# Patient Record
Sex: FEMALE | Race: AMERICAN INDIAN OR ALASKA NATIVE
[De-identification: names, ages, dates, MRNs, and addresses within clinical notes are randomized per-mention and may not be internally consistent; named-entity substitution may affect disease eponyms.]

---

## 2017-09-06 ENCOUNTER — HOSPITAL ENCOUNTER (OUTPATIENT)
Dept: HOSPITAL 5 - SPVWC | Age: 42
Discharge: HOME | End: 2017-09-06
Attending: NURSE PRACTITIONER
Payer: MEDICAID

## 2017-09-06 DIAGNOSIS — N60.41: Primary | ICD-10-CM

## 2017-09-06 DIAGNOSIS — I10: ICD-10-CM

## 2017-09-06 DIAGNOSIS — E03.9: ICD-10-CM

## 2017-09-06 DIAGNOSIS — N64.89: ICD-10-CM

## 2017-09-06 PROCEDURE — G0204 DX MAMMO INCL CAD BI: HCPCS

## 2017-09-06 PROCEDURE — 77066 DX MAMMO INCL CAD BI: CPT

## 2017-09-06 PROCEDURE — 76641 ULTRASOUND BREAST COMPLETE: CPT

## 2017-09-06 NOTE — ULTRASOUND REPORT
BILATERAL DIGITAL DIAGNOSTIC MAMMOGRAM with CAD and RIGHT BREAST 

ULTRASOUND: 09/06/17 



CLINICAL: History of a right yellowish nipple discharge.  She no longer 

has a nipple discharge but her breast-feeding child's does not like the 

milk from her right breast.



COMPARISON:06/05/13



FINDINGS: The breasts are heterogeneously dense, which may obscure 

small masses.  Right retroareolar duct ectasia is less prominent than 

on the prior exam.No mass, architectural distortion or suspicious 

calcifications .  



Ultrasound of the right breast (including all four quadrants and the 

retroareolar area) was performed.  Moderate retroareolar ductal ectasia 

which is decreased compared to the previous exam.  A retroareolar mass 

at 4 o'clock has an irregular margin and measures 1.1 x 0.6 x 0.4 cm.  

No other mass.  Ultrasound of the right axilla demonstrated a lymph 

node with central fat and benign morphology measuring 8 x 4 x 6 mm.





IMPRESSION: Moderate retroareolar right duct ectasia and a 1.1 cm right 

retroareolar intraductal mass.  Negative left breast.



BI-RADS CATEGORY:  4--Suspicious



RECOMMENDATION: Ultrasound guided vacuum assisted needle core biopsy of 

the right breast.



I discussed the findings and the recommendation for an 

ultrasound-guided right needle core breast biopsy with the patient at 

the time of the examination.



ACR BI-RADS MAMMOGRAPHIC CODES:

0 = Needs additional imaging evaluation; 1 = Negative; 2 = Benign; 3 = 

Probably benign; 4 = Suspicious; 5 = Malignant; 6 = Known biopsy-proven 

malignancy



COMMENT:

      1.   Dense breast tissue, i.e., adenosis, fibrocystic 

            changes, etc., may obscure an underlying neoplasm.

      2.   Approximately 10% of cancers are not detected with

            mammography.

      3.   A negative mammography report should not delay biopsy 

            if a clinically suspicious mass is present.





COMMENT:

Patient follow-up letters are generated by our [employer] application.
week(s)/1

## 2017-09-06 NOTE — MAMMOGRAPHY REPORT
BILATERAL DIGITAL DIAGNOSTIC MAMMOGRAM with CAD and RIGHT BREAST 

ULTRASOUND: 09/06/17 



CLINICAL: History of a right yellowish nipple discharge.  She no longer 

has a nipple discharge but her breast-feeding child's does not like the 

milk from her right breast.



COMPARISON:06/05/13



FINDINGS: The breasts are heterogeneously dense, which may obscure 

small masses.  Right retroareolar duct ectasia is less prominent than 

on the prior exam.No mass, architectural distortion or suspicious 

calcifications .  



Ultrasound of the right breast (including all four quadrants and the 

retroareolar area) was performed.  Moderate retroareolar ductal ectasia 

which is decreased compared to the previous exam.  A retroareolar mass 

at 4 o'clock has an irregular margin and measures 1.1 x 0.6 x 0.4 cm.  

No other mass.  Ultrasound of the right axilla demonstrated a lymph 

node with central fat and benign morphology measuring 8 x 4 x 6 mm.





IMPRESSION: Moderate retroareolar right duct ectasia and a 1.1 cm right 

retroareolar intraductal mass.  Negative left breast.



BI-RADS CATEGORY:  4--Suspicious



RECOMMENDATION: Ultrasound guided vacuum assisted needle core biopsy of 

the right breast.



I discussed the findings and the recommendation for an 

ultrasound-guided right needle core breast biopsy with the patient at 

the time of the examination.



ACR BI-RADS MAMMOGRAPHIC CODES:

0 = Needs additional imaging evaluation; 1 = Negative; 2 = Benign; 3 = 

Probably benign; 4 = Suspicious; 5 = Malignant; 6 = Known biopsy-proven 

malignancy



COMMENT:

      1.   Dense breast tissue, i.e., adenosis, fibrocystic 

            changes, etc., may obscure an underlying neoplasm.

      2.   Approximately 10% of cancers are not detected with

            mammography.

      3.   A negative mammography report should not delay biopsy 

            if a clinically suspicious mass is present.





COMMENT:

Patient follow-up letters are generated by our [employer] application.

## 2017-09-20 ENCOUNTER — HOSPITAL ENCOUNTER (OUTPATIENT)
Dept: HOSPITAL 5 - SPVWC | Age: 42
Discharge: HOME | End: 2017-09-20
Attending: NURSE PRACTITIONER
Payer: MEDICAID

## 2017-09-20 DIAGNOSIS — I10: ICD-10-CM

## 2017-09-20 DIAGNOSIS — N63: Primary | ICD-10-CM

## 2017-09-20 PROCEDURE — 19083 BX BREAST 1ST LESION US IMAG: CPT

## 2017-09-20 PROCEDURE — 88305 TISSUE EXAM BY PATHOLOGIST: CPT

## 2017-09-20 PROCEDURE — A4648 IMPLANTABLE TISSUE MARKER: HCPCS

## 2017-09-20 NOTE — ULTRASOUND REPORT
VACUUM ASSISTED ULTRASOUND GUIDED NEEDLE CORE BIOPSY WITH CLIP 

PLACEMENT RIGHT BREAST: 09/20/17 08:46:00





CLINICAL: A 1.1 cm right retroareolar intraductal mass.



COMPARISON :09/06/17



FINDINGS: The procedure was explained to the patient and informed 

consent was obtained.  



Ultrasound demonstrated the previously described mass.



The skin was prepped with Betadine and anesthetized with 1% lidocaine.  

Vacuum-assisted needle core biopsy was performed through a small 

dermatotomy using ultrasound guidance, 2% lidocaine with epinephrine 

for deep anesthesia and a 13-gauge Mammotome Elite biopsy probe.  

Multiple cores were obtained and placed in formalin.  A hydro-bran clip 

was deployed within the lesion. 



Hemostasis was achieved with minimal pressure and a sterile dressing 

was applied. The patient tolerated the procedure well and there were no 

apparent complications. 

A post procedure mammogram was not performed.



The patient left the department in good condition and was given 

instructions for wound care and follow up.





IMPRESSION: Uncomplicated vacuum-assisted ultrasound core biopsy and 

clip placement right breast.

## 2019-07-12 LAB
BUN SERPL-MCNC: 15 MG/DL (ref 7–17)
BUN/CREAT SERPL: 21 %
CALCIUM SERPL-MCNC: 9.3 MG/DL (ref 8.4–10.2)
HCT VFR BLD CALC: 39.3 % (ref 30.3–42.9)
HEMOLYSIS INDEX: 18
HGB BLD-MCNC: 13.6 GM/DL (ref 10.1–14.3)
MCHC RBC AUTO-ENTMCNC: 35 % (ref 30–34)
MCV RBC AUTO: 91 FL (ref 79–97)
PLATELET # BLD: 227 K/MM3 (ref 140–440)
RBC # BLD AUTO: 4.33 M/MM3 (ref 3.65–5.03)

## 2019-07-12 NOTE — ANESTHESIA CONSULTATION
Anesthesia Consult and Med Hx


Date of service: 07/16/19





- Airway


Anesthetic Teeth Evaluation: Good, Caps


ROM Head & Neck: Adequate


Mental/Hyoid Distance: Adequate


Mallampati Class: Class II


Intubation Access Assessment: Good





- Pre-Operative Health Status


ASA Pre-Surgery Classification: ASA2


Proposed Anesthetic Plan: General





- Pulmonary


Hx Smoking: Yes (Former)


Hx Asthma: No


COPD: No


Hx Pneumonia: No





- Cardiovascular System


Hx Hypertension: Yes (x 4 yrs)





- Central Nervous System


Hx Seizures: No


Hx Psychiatric Problems: Yes (Anxiety)





- Endocrine


Hx Renal Disease: No


Hx End Stage Renal Disease: No


Hx Hypothyroidism: Yes


Hx Hyperthyroidism: No





- Hematic


Hx Anemia: No


Hx Sickle Cell Disease: No





- Other Systems


Hx Alcohol Use: No


Hx Substance Use: Yes (Marijuana daily)


Hx Cancer: No

## 2019-07-16 ENCOUNTER — HOSPITAL ENCOUNTER (OUTPATIENT)
Dept: HOSPITAL 5 - OR | Age: 44
Discharge: HOME | End: 2019-07-16
Attending: OBSTETRICS & GYNECOLOGY
Payer: MEDICAID

## 2019-07-16 VITALS — SYSTOLIC BLOOD PRESSURE: 114 MMHG | DIASTOLIC BLOOD PRESSURE: 71 MMHG

## 2019-07-16 DIAGNOSIS — E03.9: ICD-10-CM

## 2019-07-16 DIAGNOSIS — Z98.890: ICD-10-CM

## 2019-07-16 DIAGNOSIS — Z30.432: ICD-10-CM

## 2019-07-16 DIAGNOSIS — Z91.011: ICD-10-CM

## 2019-07-16 DIAGNOSIS — I10: ICD-10-CM

## 2019-07-16 DIAGNOSIS — Z79.899: ICD-10-CM

## 2019-07-16 DIAGNOSIS — Z88.5: ICD-10-CM

## 2019-07-16 DIAGNOSIS — F41.9: ICD-10-CM

## 2019-07-16 DIAGNOSIS — Z30.2: Primary | ICD-10-CM

## 2019-07-16 DIAGNOSIS — Z87.891: ICD-10-CM

## 2019-07-16 PROCEDURE — 85027 COMPLETE CBC AUTOMATED: CPT

## 2019-07-16 PROCEDURE — 88300 SURGICAL PATH GROSS: CPT

## 2019-07-16 PROCEDURE — 58670 LAPAROSCOPY TUBAL CAUTERY: CPT

## 2019-07-16 PROCEDURE — 36415 COLL VENOUS BLD VENIPUNCTURE: CPT

## 2019-07-16 PROCEDURE — 88302 TISSUE EXAM BY PATHOLOGIST: CPT

## 2019-07-16 PROCEDURE — 80048 BASIC METABOLIC PNL TOTAL CA: CPT

## 2019-07-16 PROCEDURE — 84703 CHORIONIC GONADOTROPIN ASSAY: CPT

## 2019-07-16 PROCEDURE — 58301 REMOVE INTRAUTERINE DEVICE: CPT

## 2019-07-16 NOTE — SHORT STAY SUMMARY
Short Stay Documentation


Date of service: 19


Narrative H&P: 





Pt is a 43yo BF  LMP 19 presents for permanent sterilization.  She 

currently has an IUD which she would like to be removed also.





- History


Principal diagnosis: Desires permanent sterilization


H&P: obtained from office


Past Medical History: hypertension


Past Surgical History: Other (uterine surgery)


Social history: no significant social history, 





- Allergies and Medications


Current Medications: 


                                    Allergies





morphine Allergy (Verified 19 09:32)


   Itching


lactose Adverse Reaction (Verified 19 09:32)


   Unknown





                                Home Medications











 Medication  Instructions  Recorded  Confirmed  Last Taken  Type


 


Levothyroxine [Synthroid] 88 mcg PO DAILY 04/19/15 07/12/19 05/27/15 21:30 

History


 


Citalopram [celeXA] 20 mg PO QDAY 19 Unknown History


 


Metoprolol [Lopressor TAB] 50 mg PO BID 19 Unknown History


 


Triamter/Hctz 37.5-25 mg 1 tab PO QDAY 19 Unknown History





[Maxzide-25]     


 


amLODIPine [Norvasc] 10 mg PO DAILY 19 Unknown History








Active Medications





Celecoxib (Celebrex)  200 mg PO PREOP NR


   Stop: 19 23:59


Fentanyl (Sublimaze)  50 mcg IV Q5MIN PRN


   PRN Reason: Pain , Severe (7-10)


   Stop: 19 20:00


Gabapentin (Neurontin)  300 mg PO PREOP NR


   Stop: 19 23:59


Lactated Ringer's (Lactated Ringers)  1,000 mls @ 100 mls/hr IV AS DIRECT LEONOR


Midazolam HCl (Versed)  2 mg IV PREOP NR


   Stop: 19 23:59


Ondansetron HCl (Zofran)  4 mg IV ONCE PRN


   PRN Reason: Nausea And Vomiting


   Stop: 19 16:00











- Physical exam


General appearance: no acute distress


Integumentary: no rash


HEENT: Atraumatic


Lungs: Clear to auscultation


Breasts: deferred


Heart: Regular rate


Gastrointestinal: normal


Female Genitourinary: deferred


Rectal Exam: deferred


Extremities: no ischemia, No edema


Neurological: Normal gait, Normal speech





- Brief post op/procedure progress note


Date of procedure: 19


Pre-op diagnosis: Desires permanent sterilization


Post-op diagnosis: same


Procedure: 





1.  Laparoscopic bilateral tubal ligation   2.  IUD removal


Anesthesia: GETA


Findings: 





Mirena IUD easily removed.  Normal uterus with bowel adhesions obscuring the 

left fallopian tube and ovary.  Normal right fallopian tube and ovary.


Surgeon: LUANNE COLE


Estimated blood loss: minimal


Pathology: list (IUD)


Specimen disposition: to lab


Condition: stable





- Hospital course


Hospital course: 





Unremarkable.





- Disposition


Condition at discharge: Good


Disposition: DC-01 TO HOME OR SELFCARE





- Discharge Diagnoses


(1) Sterilization


Status: Resolved   





(2) Encounter for IUD removal


Status: Resolved   





Short Stay Discharge Plan


Activity: no restrictions


Diet: regular


Wound: open to air, keep clean and dry


Follow up with: 


LUANNE COLE MD [Staff Physician] - 14 Days


EARL SHETTY NP [Primary Care Provider] - 14 Days


Prescriptions: 


HYDROcodone/APAP 5-325 [[place] 5/325] 1 each PO Q6HR PRN #20 tablet


 PRN Reason: Pain

## 2019-07-16 NOTE — OPERATIVE REPORT
Operative Report


Operative Report: 





PREOPERATIVE DIAGNOSIS: 1.  Desires permanent sterilization   2.  Desires IUD 

removal





POSTOPERATIVE DIAGNOSIS: Same





OPERATIVE PROCEDURE:  Laparoscopic bilateral tubal ligation.





SURGEON:  Johnathan Squires MD





ANESTHESIA: Gen. endotracheal intubation





ANESTHESIOLOGIST: Dr. Gaona





ESTIMATED BLOOD LOSS: Minimal





FINDINGS: Mirena IUD easily removed.  Normal uterus with bowel adhesions 

obscuring the left fallopian tube and ovary.  Normal right fallopian tube and 

ovary.





COMPLICATIONS: None





COUNTS:  Correct x3.





PROCEDURE:  After the patient was correctly identified and after general 

anesthesia was administered, the patient was prepped and draped in usual sterile

fashion and placed in dorsal lithotomy position.  First, the bladder was emptied

using a straight catheter.  Next, a speculum was placed in the vaginal vault and

the anterior lip of the cervix was grasped using a single-tooth tenaculum.  Ring

forceps was used to remove the IUD which was sent to pathology. The uterine 

manipulator was then placed and the tenaculum and speculum were removed.  

Attention was then turned to the abdomen where first a periumbilical incision 

was made using a skin knife, and the Optiview trocar was inserted under direct 

visualization.  After an adequate amount of abdominal insufflation, 

visualization of the pelvic organs found the uterus to be normal, but the left 

fallopian tube and ovary were obscured by bowel adhesions, and the right 

fallopian tube and ovary was found to be normal.  Next, the left fallopian tube 

was grasped using the Kleppingers, and this tube was cauterized in 3 continuous 

places along the proximal portion of the left tube.  The same procedure was 

performed on the right fallopian tube after first identifying the fimbriated end

of the right tube.  This tube was also cauterized in 3 continuous places along 

the proximal portion of the right tube.  At this point, the procedure was then 

considered complete.  All instruments were removed from the abdomen.  





The abdomen was deflated and the periumbilical incision was closed using 0 

Vicryl suture in a figure-of-eight configuration on the fascia, followed by 4-0 

Monocryl suture in sub-cuticular fashion on the skin.  The incision was also 

infiltrated using 0.5% Marcaine solution.  The uterine manipulator was removed. 

The patient tolerated the procedure well and was transferred to recovery room 

stable condition.

## 2020-11-03 ENCOUNTER — HOSPITAL ENCOUNTER (OUTPATIENT)
Dept: HOSPITAL 5 - SPVWC | Age: 45
Discharge: HOME | End: 2020-11-03
Attending: ADVANCED PRACTICE MIDWIFE
Payer: COMMERCIAL

## 2020-11-03 DIAGNOSIS — N64.59: ICD-10-CM

## 2020-11-03 DIAGNOSIS — R92.8: Primary | ICD-10-CM

## 2020-11-03 PROCEDURE — 77066 DX MAMMO INCL CAD BI: CPT

## 2020-11-03 NOTE — MAMMOGRAPHY REPORT
BILATERAL DIGITAL DIAGNOSTIC MAMMOGRAM WITH CAD , 11/3/2020

RIGHT LIMITED BREAST ULTRASOUND

 

CLINICAL INFORMATION / INDICATION: Patient provides history of right bloody nipple discharge as well 
as skin dimpling. Patient has had right subareolar breast biopsy previously.



TECHNIQUE: Digital bilateral mammographic imaging was performed. Magnification views were obtained. L
imited ultrasound was performed. This examination was interpreted with the benefit of Computer-Aided 
Detection (CAD) analysis. 



COMPARISON: 9/7/2018 mammogram and right breast ultrasound 9/6/2017



FINDINGS: 



Breast Density: There are scattered areas of fibroglandular density.



MAMMOGRAPHIC FINDINGS: No dominant mass, suspicious calcifications, or architectural distortion in th
e left breast. There is a tubular mass in the subareolar right breast at approximately 3:00. At the a
nterior margin of this mass is a biopsy clip as well as a few new calcifications. This is not changed
 significantly from 2018 but does appear to have developed since 2017 mammogram. The mass measures ap
proximately 3.8 cm in greatest longitudinal measurement and is approximately 1.3 cm deep to the nippl
e there is no other significant interval change in the right breast.



ULTRASOUND FINDINGS: Targeted ultrasound evaluation was performed of the area of interest.   Sonograp
hic evaluation of the subareolar right breast demonstrates a solid appearing tubular mass in the 3:00
 position of the anterior breast most likely intraductal. This correlates with mammographic findings.
 This mass measures approximately 2.5 x 0.5 cm and does contain internal vascularity. Additionally, t
here is a separate intraductal oval soft tissue mass at 12:00, measuring 8 mm, 1 cm from nipple.





IMPRESSION: Tubular solid mass (probably intraductal) in the right subareolar breast at 3:00. Recomme
nd ultrasound-guided biopsy of this mass. There is a clip in the anterior aspect of this mass, theref
ore, I would perform biopsy on the mid or posterior aspect of this mass to ensure better sampling of 
tissue. Additionally, there is a separate oval solid intraductal mass at 12:00. Ultrasound-guided bio
psy is suggested of this mass as well.



Follow up recommendation: Biopsy



BI-RADS Category 4: Suspicious for Malignancy. 



-------------------------------------------------------------------------------------------

A "normal" or negative report should not discourage follow up or biopsy of a clinically significant f
inding.



A written summary of these findings will be mailed to the patient. The patient will be entered into a
 mammography reporting system which will generate a reminder letter for the patient's next appointmen
t at the appropriate interval.



According to the American College of Radiology, yearly mammograms are recommended starting at age 40 
and continuing as long as a woman is in good health.  Breast MRI is recommended for women with an medardo
roximately 20-25% or greater lifetime risk of breast cancer, including women with a strong family his
tory of breast or ovarian cancer and women who have been treated for Hodgkin's disease.



Signer Name: Whit Cuellar MD 

Signed: 11/3/2020 10:23 AM

Workstation Name: [email or web address]

## 2020-11-03 NOTE — ULTRASOUND REPORT
BILATERAL DIGITAL DIAGNOSTIC MAMMOGRAM WITH CAD , 11/3/2020

RIGHT LIMITED BREAST ULTRASOUND

 

CLINICAL INFORMATION / INDICATION: Patient provides history of right bloody nipple discharge as well 
as skin dimpling. Patient has had right subareolar breast biopsy previously.



TECHNIQUE: Digital bilateral mammographic imaging was performed. Magnification views were obtained. L
imited ultrasound was performed. This examination was interpreted with the benefit of Computer-Aided 
Detection (CAD) analysis. 



COMPARISON: 9/7/2018 mammogram and right breast ultrasound 9/6/2017



FINDINGS: 



Breast Density: There are scattered areas of fibroglandular density.



MAMMOGRAPHIC FINDINGS: No dominant mass, suspicious calcifications, or architectural distortion in th
e left breast. There is a tubular mass in the subareolar right breast at approximately 3:00. At the a
nterior margin of this mass is a biopsy clip as well as a few new calcifications. This is not changed
 significantly from 2018 but does appear to have developed since 2017 mammogram. The mass measures ap
proximately 3.8 cm in greatest longitudinal measurement and is approximately 1.3 cm deep to the nippl
e there is no other significant interval change in the right breast.



ULTRASOUND FINDINGS: Targeted ultrasound evaluation was performed of the area of interest.   Sonograp
hic evaluation of the subareolar right breast demonstrates a solid appearing tubular mass in the 3:00
 position of the anterior breast most likely intraductal. This correlates with mammographic findings.
 This mass measures approximately 2.5 x 0.5 cm and does contain internal vascularity. Additionally, t
here is a separate intraductal oval soft tissue mass at 12:00, measuring 8 mm, 1 cm from nipple.





IMPRESSION: Tubular solid mass (probably intraductal) in the right subareolar breast at 3:00. Recomme
nd ultrasound-guided biopsy of this mass. There is a clip in the anterior aspect of this mass, theref
ore, I would perform biopsy on the mid or posterior aspect of this mass to ensure better sampling of 
tissue. Additionally, there is a separate oval solid intraductal mass at 12:00. Ultrasound-guided bio
psy is suggested of this mass as well.



Follow up recommendation: Biopsy



BI-RADS Category 4: Suspicious for Malignancy. 



-------------------------------------------------------------------------------------------

A "normal" or negative report should not discourage follow up or biopsy of a clinically significant f
inding.



A written summary of these findings will be mailed to the patient. The patient will be entered into a
 mammography reporting system which will generate a reminder letter for the patient's next appointmen
t at the appropriate interval.



According to the American College of Radiology, yearly mammograms are recommended starting at age 40 
and continuing as long as a woman is in good health.  Breast MRI is recommended for women with an medardo
roximately 20-25% or greater lifetime risk of breast cancer, including women with a strong family his
tory of breast or ovarian cancer and women who have been treated for Hodgkin's disease.



Signer Name: Whit Cuellar MD 

Signed: 11/3/2020 10:23 AM

Workstation Name: [email or web address]

## 2020-12-02 ENCOUNTER — HOSPITAL ENCOUNTER (OUTPATIENT)
Dept: HOSPITAL 5 - SPVWC | Age: 45
Discharge: HOME | End: 2020-12-02
Attending: ADVANCED PRACTICE MIDWIFE
Payer: COMMERCIAL

## 2020-12-02 DIAGNOSIS — N64.89: ICD-10-CM

## 2020-12-02 DIAGNOSIS — Z87.891: ICD-10-CM

## 2020-12-02 DIAGNOSIS — F41.9: ICD-10-CM

## 2020-12-02 DIAGNOSIS — E03.9: ICD-10-CM

## 2020-12-02 DIAGNOSIS — N63.12: Primary | ICD-10-CM

## 2020-12-02 DIAGNOSIS — N63.11: ICD-10-CM

## 2020-12-02 DIAGNOSIS — Z88.5: ICD-10-CM

## 2020-12-02 DIAGNOSIS — I10: ICD-10-CM

## 2020-12-02 DIAGNOSIS — Z98.890: ICD-10-CM

## 2020-12-02 DIAGNOSIS — N60.81: ICD-10-CM

## 2020-12-02 DIAGNOSIS — Z79.899: ICD-10-CM

## 2020-12-02 DIAGNOSIS — N62: ICD-10-CM

## 2020-12-02 PROCEDURE — 88305 TISSUE EXAM BY PATHOLOGIST: CPT

## 2020-12-02 NOTE — ULTRASOUND REPORT
ULTRASOUND-GUIDED RIGHT BREAST BIOPSY x2



INDICATION: Intraductal lesions at the 3:00 and 12:00 positions on recent ultrasound. History of prev
ious biopsy in the area of the 3:00 lesion.



COMPARISON: Right breast ultrasound 11/3/2020, 9/6/2017, 9/20/2017



CONSENT: Procedure was discussed at length in advance with the patient. Possible risks and benefits w
ere discussed including the possibility of bleeding. Postbiopsy care was discussed. Opportunity for q
uestions was provided. Patient is not on anticoagulant therapy and reports no pertinent allergies.



PROCEDURE: Timeout was performed.



1. The large intraductal hypoechoic area at the 3:00 position was targeted sonographically. Using ase
ptic technique and under local anesthesia, with real-time sonographic guidance, the area of interest 
was biopsied. Multiple specimens were obtained with an Achieve 14-gauge biopsy device and sent to [facility] for analysis. A metallic clip was placed at the end of the procedure. Site was secured and the
 patient was sent for post biopsy mammogram. Patient tolerated the procedure well and left the depart
ment in good condition.



2. The small intraductal hypoechoic area at the approximate 11:30 position was targeted sonographical
ly. Using aseptic technique and under local anesthesia, with real-time sonographic guidance, the area
 of interest was biopsied. Multiple specimens were obtained with a Bard 14-gauge biopsy device and se
nt to pathology for analysis. A metallic clip was placed at the end of the procedure. Site was secure
d and the patient was sent for post biopsy mammogram. Patient tolerated the procedure well and left [facility] department in good condition.





IMPRESSION: Successful ultrasound-guided right breast biopsy x2



Signer Name: Titi More MD 

Signed: 12/2/2020 4:14 PM

Workstation Name: [id number]

## 2020-12-02 NOTE — MAMMOGRAPHY REPORT
DIGITAL DIAGNOSTIC MAMMOGRAM WITH CAD CONVENTIONAL, 12/2/2020



CLINICAL INFORMATION / INDICATION: Post ultrasound-guided biopsy x2



TECHNIQUE:  Digital right mammographic imaging was performed.

This examination was interpreted with the benefit of Computer-aided Detection analysis. 



COMPARISON: Bilateral mammogram 11/3/2020



FINDINGS: 



Breast Density: The breasts are heterogeneously dense, which may obscure small masses.



: Biopsy clip is again seen directly posterior to the nipple. The biopsy clip in the medial anterior 
breast is in the expected location of the 3:00 lesion. New clip in a more lateral position superiorly
 at approximately 11:00 is slightly more lateral than expected by the roughly 11:30 position of the s
mall lesion.



IMPRESSION: Clip positions as above



Follow up recommendation: Per biopsy results



Post biopsy imaging.



-------------------------------------------------------------------------------------------

A "normal" or negative report should not discourage follow up or biopsy of a clinically significant f
inding.



A written summary of these findings will be mailed to the patient. The patient will be entered into a
 mammography reporting system which will generate a reminder letter for the patient's next appointmen
t at the appropriate interval.



According to the American College of [employer]iology, yearly mammograms are recommended starting at age 40 
and continuing as long as a woman is in good health.  Breast MRI is recommended for women with an medardo
roximately 20-25% or greater lifetime risk of breast cancer, including women with a strong family his
tory of breast or ovarian cancer and women who have been treated for Hodgkin's disease.



Signer Name: Titi More MD 

Signed: 12/2/2020 4:10 PM

Workstation Name: [id number]

## 2020-12-02 NOTE — ULTRASOUND REPORT
ULTRASOUND-GUIDED RIGHT BREAST BIOPSY x2



INDICATION: Intraductal lesions at the 3:00 and 12:00 positions on recent ultrasound. History of prev
ious biopsy in the area of the 3:00 lesion.



COMPARISON: Right breast ultrasound 11/3/2020, 9/6/2017, 9/20/2017



CONSENT: Procedure was discussed at length in advance with the patient. Possible risks and benefits w
ere discussed including the possibility of bleeding. Postbiopsy care was discussed. Opportunity for q
uestions was provided. Patient is not on anticoagulant therapy and reports no pertinent allergies.



PROCEDURE: Timeout was performed.



1. The large intraductal hypoechoic area at the 3:00 position was targeted sonographically. Using ase
ptic technique and under local anesthesia, with real-time sonographic guidance, the area of interest 
was biopsied. Multiple specimens were obtained with an Achieve 14-gauge biopsy device and sent to [facility] for analysis. A metallic clip was placed at the end of the procedure. Site was secured and the
 patient was sent for post biopsy mammogram. Patient tolerated the procedure well and left the depart
ment in good condition.



2. The small intraductal hypoechoic area at the approximate 11:30 position was targeted sonographical
ly. Using aseptic technique and under local anesthesia, with real-time sonographic guidance, the area
 of interest was biopsied. Multiple specimens were obtained with a Bard 14-gauge biopsy device and se
nt to pathology for analysis. A metallic clip was placed at the end of the procedure. Site was secure
d and the patient was sent for post biopsy mammogram. Patient tolerated the procedure well and left [facility] department in good condition.





IMPRESSION: Successful ultrasound-guided right breast biopsy x2



Signer Name: Titi More MD 

Signed: 12/2/2020 4:14 PM

Workstation Name: [id number]

## 2021-09-09 ENCOUNTER — HOSPITAL ENCOUNTER (OUTPATIENT)
Dept: HOSPITAL 5 - MAMMO | Age: 46
Discharge: HOME | End: 2021-09-09
Attending: FAMILY MEDICINE
Payer: COMMERCIAL

## 2021-09-09 DIAGNOSIS — N63.12: Primary | ICD-10-CM

## 2021-09-09 PROCEDURE — 77066 DX MAMMO INCL CAD BI: CPT

## 2021-09-09 NOTE — ULTRASOUND REPORT
BILATERAL DIGITAL DIAGNOSTIC MAMMOGRAM WITH CAD , 9/9/2021

RIGHT COMPLETE BREAST ULTRASOUND

 

CLINICAL INFORMATION / INDICATION: Patient's physician palpates a lump in the right breast. PATIENT a
lso states she has nonbloody right nipple discharge.



TECHNIQUE: Digital bilateral mammographic imaging was performed. Complete ultrasound of all four (4) 
quadrants was performed. This examination was interpreted with the benefit of Computer-Aided Detectio
n (CAD) analysis. 



COMPARISON: Prior mammograms including 11/3/2020 through 9/7/2018



FINDINGS: 



Breast Density: There are scattered areas of fibroglandular density.



MAMMOGRAPHIC FINDINGS: No dominant mass, suspicious calcifications, or architectural distortion in ei
ther breast. Focal asymmetry in the medial aspect of the right subareolar breast has undergone percut
aneous biopsy with biopsy clip present. Additional clips are also seen in the subareolar right breast
. No other significant interval change.



ULTRASOUND FINDINGS: Complete sonographic evaluation of all 4 quadrants and retroareolar region was p
erformed.   Sonographic evaluation of the entire right breast was performed. There is a dilated duct 
in the medial aspect of the right subareolar breast at 3:00. A solid intraductal mass is present robyn
uring 2.5 x 0.6 cm. This is essentially unchanged compared to the 11/3/2020 ultrasound and correspond
s to area biopsied on 12/2/2020 with results of intraductal papilloma.



No additional abnormalities noted.



IMPRESSION: No mammographic or sonographic evidence of malignancy. No interval change from 2020 mammo
gram. A 2.5 cm intraductal solid mass persists in the right breast at 3:00. This mass has been previo
usly biopsied with result of intraductal papilloma.



Follow up recommendation: Routine yearly. 



BI-RADS Category 2:  Benign.



-------------------------------------------------------------------------------------------

A "normal" or negative report should not discourage follow up or biopsy of a clinically significant f
inding.



A written summary of these findings will be mailed to the patient. The patient will be entered into a
 mammography reporting system which will generate a reminder letter for the patient's next appointmen
t at the appropriate interval.



According to the [employer], yearly mammograms are recommended starting at age 40 
and continuing as long as a woman is in good health.  Breast MRI is recommended for women with an medardo
roximately 20-25% or greater lifetime risk of breast cancer, including women with a strong family his
tory of breast or ovarian cancer and women who have been treated for Hodgkin's disease.



Signer Name: Whit Cuellar MD 

Signed: 9/9/2021 6:40 PM

Workstation Name: [id number]

## 2021-09-09 NOTE — MAMMOGRAPHY REPORT
BILATERAL DIGITAL DIAGNOSTIC MAMMOGRAM WITH CAD , 9/9/2021

RIGHT COMPLETE BREAST ULTRASOUND

 

CLINICAL INFORMATION / INDICATION: Patient's physician palpates a lump in the right breast. PATIENT a
lso states she has nonbloody right nipple discharge.



TECHNIQUE: Digital bilateral mammographic imaging was performed. Complete ultrasound of all four (4) 
quadrants was performed. This examination was interpreted with the benefit of Computer-Aided Detectio
n (CAD) analysis. 



COMPARISON: Prior mammograms including 11/3/2020 through 9/7/2018



FINDINGS: 



Breast Density: There are scattered areas of fibroglandular density.



MAMMOGRAPHIC FINDINGS: No dominant mass, suspicious calcifications, or architectural distortion in ei
ther breast. Focal asymmetry in the medial aspect of the right subareolar breast has undergone percut
aneous biopsy with biopsy clip present. Additional clips are also seen in the subareolar right breast
. No other significant interval change.



ULTRASOUND FINDINGS: Complete sonographic evaluation of all 4 quadrants and retroareolar region was p
erformed.   Sonographic evaluation of the entire right breast was performed. There is a dilated duct 
in the medial aspect of the right subareolar breast at 3:00. A solid intraductal mass is present robyn
uring 2.5 x 0.6 cm. This is essentially unchanged compared to the 11/3/2020 ultrasound and correspond
s to area biopsied on 12/2/2020 with results of intraductal papilloma.



No additional abnormalities noted.



IMPRESSION: No mammographic or sonographic evidence of malignancy. No interval change from 2020 mammo
gram. A 2.5 cm intraductal solid mass persists in the right breast at 3:00. This mass has been previo
usly biopsied with result of intraductal papilloma.



Follow up recommendation: Routine yearly. 



BI-RADS Category 2:  Benign.



-------------------------------------------------------------------------------------------

A "normal" or negative report should not discourage follow up or biopsy of a clinically significant f
inding.



A written summary of these findings will be mailed to the patient. The patient will be entered into a
 mammography reporting system which will generate a reminder letter for the patient's next appointmen
t at the appropriate interval.



According to the [employer], yearly mammograms are recommended starting at age 40 
and continuing as long as a woman is in good health.  Breast MRI is recommended for women with an medardo
roximately 20-25% or greater lifetime risk of breast cancer, including women with a strong family his
tory of breast or ovarian cancer and women who have been treated for Hodgkin's disease.



Signer Name: Whit Cuellar MD 

Signed: 9/9/2021 6:40 PM

Workstation Name: [id number]